# Patient Record
Sex: MALE | Race: WHITE | Employment: FULL TIME | URBAN - METROPOLITAN AREA
[De-identification: names, ages, dates, MRNs, and addresses within clinical notes are randomized per-mention and may not be internally consistent; named-entity substitution may affect disease eponyms.]

---

## 2017-09-06 ENCOUNTER — OCC HEALTH (OUTPATIENT)
Dept: OCCUPATIONAL MEDICINE | Age: 37
End: 2017-09-06
Attending: PHYSICIAN ASSISTANT

## 2018-06-28 ENCOUNTER — TELEPHONE (OUTPATIENT)
Dept: INTERNAL MEDICINE CLINIC | Facility: CLINIC | Age: 38
End: 2018-06-28

## 2018-08-13 ENCOUNTER — OFFICE VISIT (OUTPATIENT)
Dept: INTERNAL MEDICINE CLINIC | Facility: CLINIC | Age: 38
End: 2018-08-13
Payer: COMMERCIAL

## 2018-08-13 VITALS
SYSTOLIC BLOOD PRESSURE: 122 MMHG | BODY MASS INDEX: 32 KG/M2 | TEMPERATURE: 98 F | DIASTOLIC BLOOD PRESSURE: 80 MMHG | RESPIRATION RATE: 12 BRPM | OXYGEN SATURATION: 99 % | WEIGHT: 235.5 LBS | HEART RATE: 61 BPM

## 2018-08-13 DIAGNOSIS — M25.561 ACUTE PAIN OF RIGHT KNEE: Primary | ICD-10-CM

## 2018-08-13 PROCEDURE — 99213 OFFICE O/P EST LOW 20 MIN: CPT | Performed by: FAMILY MEDICINE

## 2018-08-13 RX ORDER — TRAMADOL HYDROCHLORIDE 50 MG/1
50 TABLET ORAL EVERY 6 HOURS PRN
Qty: 30 TABLET | Refills: 0 | Status: SHIPPED | OUTPATIENT
Start: 2018-08-13 | End: 2018-10-03

## 2018-08-13 NOTE — PROGRESS NOTES
Patient presents with:  Knee Pain: Right knee pain dt softball injury yesterday afternoon. Using Ibuprofen, elevating, and icing area. HPI:   Osman Rausch is a 34year old male present with complain of right knee pain.   Onset: Started yesterday a RRR without murmur  EXTREMITIES:   Right knee  exam:   - defect/deformity : none  - swelling/effusion: +  - bruising/ecchymosis: none  - Tenderness: +  -  Range of motion: from with pain  - joint laxity: none  - crepitus: none  - peripheral pulses: intact

## 2018-08-17 ENCOUNTER — HOSPITAL ENCOUNTER (OUTPATIENT)
Dept: MRI IMAGING | Age: 38
Discharge: HOME | End: 2018-08-17
Attending: FAMILY MEDICINE
Payer: COMMERCIAL

## 2018-08-17 ENCOUNTER — TELEPHONE (OUTPATIENT)
Dept: INTERNAL MEDICINE CLINIC | Facility: CLINIC | Age: 38
End: 2018-08-17

## 2018-08-17 DIAGNOSIS — M25.561 ACUTE PAIN OF RIGHT KNEE: ICD-10-CM

## 2018-08-17 PROCEDURE — 73721 MRI JNT OF LWR EXTRE W/O DYE: CPT | Performed by: FAMILY MEDICINE

## 2018-08-17 NOTE — TELEPHONE ENCOUNTER
Patient was in for [address] on 8/13/18 for R knee pain. Had MRI done this morning and would like to get results if possible by the end of the day. Notified patient that Liss Burns is not in the office and will therefore not be able to review the results.   Marlena

## 2018-08-17 NOTE — TELEPHONE ENCOUNTER
Patient would like to get Dr Luke Pérez to let him know today about these results; informed it has been charted to him and he is finishing clinic from this morning and I will pass message on to him/nurse again

## 2018-10-03 ENCOUNTER — LAB ENCOUNTER (OUTPATIENT)
Dept: LAB | Age: 38
End: 2018-10-03
Attending: FAMILY MEDICINE
Payer: COMMERCIAL

## 2018-10-03 ENCOUNTER — HOSPITAL ENCOUNTER (OUTPATIENT)
Dept: GENERAL RADIOLOGY | Age: 38
Discharge: HOME | End: 2018-10-03
Attending: FAMILY MEDICINE
Payer: COMMERCIAL

## 2018-10-03 ENCOUNTER — OFFICE VISIT (OUTPATIENT)
Dept: INTERNAL MEDICINE CLINIC | Facility: CLINIC | Age: 38
End: 2018-10-03
Payer: COMMERCIAL

## 2018-10-03 VITALS
BODY MASS INDEX: 33.05 KG/M2 | DIASTOLIC BLOOD PRESSURE: 70 MMHG | TEMPERATURE: 98 F | OXYGEN SATURATION: 99 % | WEIGHT: 244 LBS | HEART RATE: 74 BPM | SYSTOLIC BLOOD PRESSURE: 114 MMHG | HEIGHT: 72 IN

## 2018-10-03 DIAGNOSIS — Z01.818 PREOP EXAMINATION: Primary | ICD-10-CM

## 2018-10-03 DIAGNOSIS — S83.511D RUPTURE OF ANTERIOR CRUCIATE LIGAMENT OF RIGHT KNEE, SUBSEQUENT ENCOUNTER: ICD-10-CM

## 2018-10-03 DIAGNOSIS — Z01.818 PREOP EXAMINATION: ICD-10-CM

## 2018-10-03 PROCEDURE — 80053 COMPREHEN METABOLIC PANEL: CPT | Performed by: FAMILY MEDICINE

## 2018-10-03 PROCEDURE — 99243 OFF/OP CNSLTJ NEW/EST LOW 30: CPT | Performed by: FAMILY MEDICINE

## 2018-10-03 PROCEDURE — 93000 ELECTROCARDIOGRAM COMPLETE: CPT | Performed by: FAMILY MEDICINE

## 2018-10-03 PROCEDURE — 71046 X-RAY EXAM CHEST 2 VIEWS: CPT | Performed by: FAMILY MEDICINE

## 2018-10-03 PROCEDURE — 85025 COMPLETE CBC W/AUTO DIFF WBC: CPT | Performed by: FAMILY MEDICINE

## 2018-10-03 PROCEDURE — 87086 URINE CULTURE/COLONY COUNT: CPT | Performed by: FAMILY MEDICINE

## 2018-10-03 PROCEDURE — 36415 COLL VENOUS BLD VENIPUNCTURE: CPT | Performed by: FAMILY MEDICINE

## 2018-10-03 PROCEDURE — 81003 URINALYSIS AUTO W/O SCOPE: CPT | Performed by: FAMILY MEDICINE

## 2018-10-03 NOTE — PROGRESS NOTES
HPI:    Patient ID: Leonarda Closs is a 34year old male. HPI  Leonarda Closs is a 34year old male.   HPI:   Here for preop exam for his upcoming R ACL repair under the care of Dr Barrett Matters    Date is set for 10/22/18 at the [facility]     General anesthesia reviewed and he is cleared for his surgery     Review of Systems         Current Outpatient Medications:  Multiple Vitamin (DAILY MULTIVITAMIN OR) Take 1 Tab by mouth daily.  Disp:  Rfl:      Allergies:  Penicillins                 Comment:Told by mother

## 2018-10-11 ENCOUNTER — TELEPHONE (OUTPATIENT)
Dept: INTERNAL MEDICINE CLINIC | Facility: CLINIC | Age: 38
End: 2018-10-11

## 2018-10-11 NOTE — TELEPHONE ENCOUNTER
Faxed pre-op OV note, EKG, and labs to Dr. Ilsa Hernandez at [phone]. Fax confirmation received.

## 2018-10-11 NOTE — TELEPHONE ENCOUNTER
Pre-op labs reviewed with pt per provider result note. Please addend progress note to show if pt is ok to proceed with surgery. Will fax information to Dr. Kaykay Clark once completed.

## 2018-11-12 ENCOUNTER — OFFICE VISIT (OUTPATIENT)
Dept: INTERNAL MEDICINE CLINIC | Facility: CLINIC | Age: 38
End: 2018-11-12
Payer: COMMERCIAL

## 2018-11-12 VITALS
HEIGHT: 72 IN | WEIGHT: 245 LBS | BODY MASS INDEX: 33.18 KG/M2 | SYSTOLIC BLOOD PRESSURE: 126 MMHG | DIASTOLIC BLOOD PRESSURE: 86 MMHG | RESPIRATION RATE: 16 BRPM | TEMPERATURE: 98 F | HEART RATE: 78 BPM

## 2018-11-12 DIAGNOSIS — Z00.00 WELLNESS EXAMINATION: Primary | ICD-10-CM

## 2018-11-12 DIAGNOSIS — N52.9 ERECTILE DYSFUNCTION, UNSPECIFIED ERECTILE DYSFUNCTION TYPE: ICD-10-CM

## 2018-11-12 DIAGNOSIS — Z00.00 LABORATORY EXAM ORDERED AS PART OF ROUTINE GENERAL MEDICAL EXAMINATION: ICD-10-CM

## 2018-11-12 PROCEDURE — 90471 IMMUNIZATION ADMIN: CPT | Performed by: FAMILY MEDICINE

## 2018-11-12 PROCEDURE — 99396 PREV VISIT EST AGE 40-64: CPT | Performed by: FAMILY MEDICINE

## 2018-11-12 PROCEDURE — 90715 TDAP VACCINE 7 YRS/> IM: CPT | Performed by: FAMILY MEDICINE

## 2018-11-12 RX ORDER — SILDENAFIL 50 MG/1
50 TABLET, FILM COATED ORAL
Qty: 8 TABLET | Refills: 0 | Status: SHIPPED | OUTPATIENT
Start: 2018-11-12 | End: 2020-11-23

## 2018-11-12 RX ORDER — ACETAMINOPHEN AND CODEINE PHOSPHATE 300; 30 MG/1; MG/1
TABLET ORAL
Refills: 0 | COMMUNITY
Start: 2018-11-09 | End: 2019-11-21 | Stop reason: ALTCHOICE

## 2018-11-12 NOTE — PROGRESS NOTES
HPI:    Patient ID: Sudeep Bowers is a 34year old male.     HPI  Sudeep Bowers is a 34year old male who presents for a complete physical exam.   HPI:       Wt Readings from Last 6 Encounters:  11/12/18 : 245 lb  10/03/18 : 244 lb  08/13/18 : 235 l C/o ED in the last few months   Desire is there   Does not always get erection  NEURO: denies headaches  MUSC:  3 weeks ago R knee sx  Getting better   Has PT   Not really painful    PSYCHE: denies depression or anxiety  HEMATOLOGIC: denies hx of anemia  E Prescriptions      No prescriptions requested or ordered in this encounter       Imaging & Referrals:  None       #[id number]

## 2018-12-01 ENCOUNTER — TELEPHONE (OUTPATIENT)
Dept: INTERNAL MEDICINE CLINIC | Facility: CLINIC | Age: 38
End: 2018-12-01

## 2019-11-21 ENCOUNTER — OFFICE VISIT (OUTPATIENT)
Dept: INTERNAL MEDICINE CLINIC | Facility: CLINIC | Age: 39
End: 2019-11-21
Payer: COMMERCIAL

## 2019-11-21 ENCOUNTER — LAB ENCOUNTER (OUTPATIENT)
Dept: LAB | Age: 39
End: 2019-11-21
Attending: FAMILY MEDICINE
Payer: COMMERCIAL

## 2019-11-21 VITALS
DIASTOLIC BLOOD PRESSURE: 82 MMHG | HEART RATE: 74 BPM | BODY MASS INDEX: 31.35 KG/M2 | OXYGEN SATURATION: 98 % | HEIGHT: 72.5 IN | WEIGHT: 234 LBS | SYSTOLIC BLOOD PRESSURE: 118 MMHG

## 2019-11-21 DIAGNOSIS — Z00.00 WELLNESS EXAMINATION: ICD-10-CM

## 2019-11-21 DIAGNOSIS — Z00.00 LABORATORY EXAM ORDERED AS PART OF ROUTINE GENERAL MEDICAL EXAMINATION: ICD-10-CM

## 2019-11-21 DIAGNOSIS — Z00.00 WELLNESS EXAMINATION: Primary | ICD-10-CM

## 2019-11-21 PROCEDURE — 85025 COMPLETE CBC W/AUTO DIFF WBC: CPT | Performed by: FAMILY MEDICINE

## 2019-11-21 PROCEDURE — 36415 COLL VENOUS BLD VENIPUNCTURE: CPT | Performed by: FAMILY MEDICINE

## 2019-11-21 PROCEDURE — 80061 LIPID PANEL: CPT | Performed by: FAMILY MEDICINE

## 2019-11-21 PROCEDURE — 99396 PREV VISIT EST AGE 40-64: CPT | Performed by: FAMILY MEDICINE

## 2019-11-21 PROCEDURE — 80053 COMPREHEN METABOLIC PANEL: CPT | Performed by: FAMILY MEDICINE

## 2019-11-21 NOTE — PROGRESS NOTES
HPI:    Patient ID: Young Pat is a 37year old male.     HPI  Young Pat is a 37year old male who presents for a complete physical exam.   HPI:       Wt Readings from Last 6 Encounters:  11/21/19 : 234 lb (106.1 kg)  11/12/18 : 245 lb (111.1 pain on exertion  GI: denies abdominal pain  : denies dysuria  NEURO: denies headaches  PSYCHE: denies depression or anxiety  HEMATOLOGIC: denies hx of anemia  ENDOCRINE: denies thyroid history  ALL/ASTHMA: denies hx of allergy or asthma    EXAM:   BP 11 No prescriptions requested or ordered in this encounter       Imaging & Referrals:  None       #[id number]

## 2019-12-27 DIAGNOSIS — R79.89 ELEVATED SERUM CREATININE: ICD-10-CM

## 2019-12-27 DIAGNOSIS — E78.00 ELEVATED CHOLESTEROL: Primary | ICD-10-CM

## 2020-02-14 ENCOUNTER — TELEPHONE (OUTPATIENT)
Dept: INTERNAL MEDICINE CLINIC | Facility: CLINIC | Age: 40
End: 2020-02-14

## 2020-02-14 RX ORDER — SCOLOPAMINE TRANSDERMAL SYSTEM 1 MG/1
1 PATCH, EXTENDED RELEASE TRANSDERMAL
Qty: 2 PATCH | Refills: 0 | Status: SHIPPED | OUTPATIENT
Start: 2020-02-14 | End: 2020-11-23

## 2020-02-14 NOTE — TELEPHONE ENCOUNTER
Spoke with wife and she just wants something stronger than dramamine what ever Dr feels would work best

## 2020-02-14 NOTE — TELEPHONE ENCOUNTER
Patient Spouse calling in, seeking a motion sickness medication for upcoming trip. Will be going on a six day maryjane next Friday.     Pharmacy:  [facility] [address], [address] [place] [address], [phone]

## 2020-10-21 ENCOUNTER — TELEPHONE (OUTPATIENT)
Dept: INTERNAL MEDICINE CLINIC | Facility: CLINIC | Age: 40
End: 2020-10-21

## 2020-10-21 DIAGNOSIS — Z00.00 ROUTINE GENERAL MEDICAL EXAMINATION AT A HEALTH CARE FACILITY: Primary | ICD-10-CM

## 2020-10-21 NOTE — TELEPHONE ENCOUNTER
Patient scheduled appointment for annual physical. Requesting labs to be placed prior to appointment    Future Appointments   Date Time Provider Rony Gordonisti   11/23/2020  2:00 PM Alma Hammond MD [facility] [place]

## 2020-11-23 ENCOUNTER — OFFICE VISIT (OUTPATIENT)
Dept: INTERNAL MEDICINE CLINIC | Facility: CLINIC | Age: 40
End: 2020-11-23
Payer: COMMERCIAL

## 2020-11-23 ENCOUNTER — LAB ENCOUNTER (OUTPATIENT)
Dept: LAB | Age: 40
End: 2020-11-23
Attending: FAMILY MEDICINE
Payer: COMMERCIAL

## 2020-11-23 VITALS
RESPIRATION RATE: 16 BRPM | SYSTOLIC BLOOD PRESSURE: 134 MMHG | TEMPERATURE: 99 F | OXYGEN SATURATION: 99 % | BODY MASS INDEX: 31.73 KG/M2 | WEIGHT: 234.25 LBS | HEIGHT: 72 IN | DIASTOLIC BLOOD PRESSURE: 78 MMHG | HEART RATE: 72 BPM

## 2020-11-23 DIAGNOSIS — Z00.00 ROUTINE GENERAL MEDICAL EXAMINATION AT A HEALTH CARE FACILITY: Primary | ICD-10-CM

## 2020-11-23 DIAGNOSIS — R79.89 ELEVATED SERUM CREATININE: ICD-10-CM

## 2020-11-23 DIAGNOSIS — E78.00 ELEVATED CHOLESTEROL: ICD-10-CM

## 2020-11-23 DIAGNOSIS — Z00.00 LABORATORY EXAM ORDERED AS PART OF ROUTINE GENERAL MEDICAL EXAMINATION: ICD-10-CM

## 2020-11-23 DIAGNOSIS — Z00.00 ROUTINE GENERAL MEDICAL EXAMINATION AT A HEALTH CARE FACILITY: ICD-10-CM

## 2020-11-23 PROCEDURE — 3008F BODY MASS INDEX DOCD: CPT | Performed by: FAMILY MEDICINE

## 2020-11-23 PROCEDURE — 80053 COMPREHEN METABOLIC PANEL: CPT

## 2020-11-23 PROCEDURE — 81003 URINALYSIS AUTO W/O SCOPE: CPT

## 2020-11-23 PROCEDURE — 99072 ADDL SUPL MATRL&STAF TM PHE: CPT | Performed by: FAMILY MEDICINE

## 2020-11-23 PROCEDURE — 80061 LIPID PANEL: CPT

## 2020-11-23 PROCEDURE — 3075F SYST BP GE 130 - 139MM HG: CPT | Performed by: FAMILY MEDICINE

## 2020-11-23 PROCEDURE — 85025 COMPLETE CBC W/AUTO DIFF WBC: CPT

## 2020-11-23 PROCEDURE — 99396 PREV VISIT EST AGE 40-64: CPT | Performed by: FAMILY MEDICINE

## 2020-11-23 PROCEDURE — 3078F DIAST BP <80 MM HG: CPT | Performed by: FAMILY MEDICINE

## 2020-11-23 PROCEDURE — 36415 COLL VENOUS BLD VENIPUNCTURE: CPT

## 2020-11-23 NOTE — PROGRESS NOTES
Kristen Mann is a 41year old male who presents for a complete physical exam.   HPI:       Wt Readings from Last 6 Encounters:  11/23/20 : 234 lb 4 oz (106.3 kg)  11/21/19 : 234 lb (106.1 kg)  11/12/18 : 245 lb (111.1 kg)  10/03/18 : 244 lb (110.7 kg) exertion  GI: denies abdominal pain  : denies dysuria  NEURO: denies headaches  PSYCHE: denies depression or anxiety  HEMATOLOGIC: denies hx of anemia  ENDOCRINE: denies thyroid history  ALL/ASTHMA: denies hx of allergy or asthma    EXAM:   /78 (BP

## 2021-09-13 ENCOUNTER — IMMUNIZATION (OUTPATIENT)
Dept: LAB | Facility: HOSPITAL | Age: 41
End: 2021-09-13
Attending: EMERGENCY MEDICINE
Payer: COMMERCIAL

## 2021-09-13 DIAGNOSIS — Z23 NEED FOR VACCINATION: Primary | ICD-10-CM

## 2021-09-13 PROCEDURE — 0001A SARSCOV2 VAC 30MCG/0.3ML IM: CPT

## 2021-10-05 ENCOUNTER — IMMUNIZATION (OUTPATIENT)
Dept: LAB | Facility: HOSPITAL | Age: 41
End: 2021-10-05
Attending: EMERGENCY MEDICINE
Payer: COMMERCIAL

## 2021-10-05 DIAGNOSIS — Z23 NEED FOR VACCINATION: Primary | ICD-10-CM

## 2021-10-05 PROCEDURE — 0002A SARSCOV2 VAC 30MCG/0.3ML IM: CPT

## 2021-11-29 ENCOUNTER — OFFICE VISIT (OUTPATIENT)
Dept: INTERNAL MEDICINE CLINIC | Facility: CLINIC | Age: 41
End: 2021-11-29
Payer: COMMERCIAL

## 2021-11-29 VITALS
WEIGHT: 235 LBS | BODY MASS INDEX: 31.83 KG/M2 | HEART RATE: 76 BPM | TEMPERATURE: 98 F | DIASTOLIC BLOOD PRESSURE: 70 MMHG | HEIGHT: 72 IN | RESPIRATION RATE: 16 BRPM | SYSTOLIC BLOOD PRESSURE: 130 MMHG

## 2021-11-29 DIAGNOSIS — H61.20 IMPACTED CERUMEN, UNSPECIFIED LATERALITY: ICD-10-CM

## 2021-11-29 DIAGNOSIS — M25.511 CHRONIC RIGHT SHOULDER PAIN: ICD-10-CM

## 2021-11-29 DIAGNOSIS — Z00.00 LABORATORY EXAM ORDERED AS PART OF ROUTINE GENERAL MEDICAL EXAMINATION: ICD-10-CM

## 2021-11-29 DIAGNOSIS — Z00.00 ROUTINE GENERAL MEDICAL EXAMINATION AT A HEALTH CARE FACILITY: Primary | ICD-10-CM

## 2021-11-29 DIAGNOSIS — G89.29 CHRONIC RIGHT SHOULDER PAIN: ICD-10-CM

## 2021-11-29 PROCEDURE — 3008F BODY MASS INDEX DOCD: CPT | Performed by: FAMILY MEDICINE

## 2021-11-29 PROCEDURE — 3078F DIAST BP <80 MM HG: CPT | Performed by: FAMILY MEDICINE

## 2021-11-29 PROCEDURE — 3075F SYST BP GE 130 - 139MM HG: CPT | Performed by: FAMILY MEDICINE

## 2021-11-29 PROCEDURE — 99396 PREV VISIT EST AGE 40-64: CPT | Performed by: FAMILY MEDICINE

## 2021-11-29 NOTE — PROGRESS NOTES
Sonny Solis is a 35year old male who presents for a complete physical exam.   HPI:       Wt Readings from Last 6 Encounters:  11/29/21 : 235 lb (106.6 kg)  11/23/20 : 234 lb 4 oz (106.3 kg)  11/21/19 : 234 lb (106.1 kg)  11/12/18 : 245 lb (111.1 kg) shortness of breath  CARDIOVASCULAR: denies chest pain on exertion  GI: denies abdominal pain  : denies dysuria  NEURO: denies headaches  MUSC:   Still dealing w R shoulder pain    Has torn labrum   Has seen Dr Hien Branch: denies depression or anxiet

## 2022-11-14 ENCOUNTER — OFFICE VISIT (OUTPATIENT)
Dept: INTERNAL MEDICINE CLINIC | Facility: CLINIC | Age: 42
End: 2022-11-14
Payer: COMMERCIAL

## 2022-11-14 VITALS
HEART RATE: 76 BPM | WEIGHT: 240 LBS | RESPIRATION RATE: 14 BRPM | HEIGHT: 72 IN | BODY MASS INDEX: 32.51 KG/M2 | SYSTOLIC BLOOD PRESSURE: 122 MMHG | TEMPERATURE: 98 F | DIASTOLIC BLOOD PRESSURE: 78 MMHG | OXYGEN SATURATION: 96 %

## 2022-11-14 DIAGNOSIS — M79.671 BILATERAL FOOT PAIN: Primary | ICD-10-CM

## 2022-11-14 DIAGNOSIS — M79.672 BILATERAL FOOT PAIN: Primary | ICD-10-CM

## 2022-11-14 DIAGNOSIS — M77.8 LEFT ELBOW TENDONITIS: ICD-10-CM

## 2022-11-14 PROCEDURE — 99213 OFFICE O/P EST LOW 20 MIN: CPT | Performed by: FAMILY MEDICINE

## 2022-11-14 PROCEDURE — 3008F BODY MASS INDEX DOCD: CPT | Performed by: FAMILY MEDICINE

## 2022-11-14 PROCEDURE — 3078F DIAST BP <80 MM HG: CPT | Performed by: FAMILY MEDICINE

## 2022-11-14 PROCEDURE — 3074F SYST BP LT 130 MM HG: CPT | Performed by: FAMILY MEDICINE

## 2022-11-14 RX ORDER — METHYLPREDNISOLONE 4 MG/1
TABLET ORAL
Qty: 1 EACH | Refills: 0 | Status: SHIPPED | OUTPATIENT
Start: 2022-11-14

## 2022-12-01 ENCOUNTER — LAB ENCOUNTER (OUTPATIENT)
Dept: LAB | Age: 42
End: 2022-12-01
Attending: FAMILY MEDICINE
Payer: COMMERCIAL

## 2022-12-01 ENCOUNTER — OFFICE VISIT (OUTPATIENT)
Dept: INTERNAL MEDICINE CLINIC | Facility: CLINIC | Age: 42
End: 2022-12-01
Payer: COMMERCIAL

## 2022-12-01 VITALS
SYSTOLIC BLOOD PRESSURE: 122 MMHG | DIASTOLIC BLOOD PRESSURE: 78 MMHG | BODY MASS INDEX: 32.56 KG/M2 | TEMPERATURE: 98 F | WEIGHT: 240.38 LBS | OXYGEN SATURATION: 98 % | HEIGHT: 72 IN | HEART RATE: 65 BPM

## 2022-12-01 DIAGNOSIS — Z00.00 ROUTINE GENERAL MEDICAL EXAMINATION AT A HEALTH CARE FACILITY: Primary | ICD-10-CM

## 2022-12-01 DIAGNOSIS — Z00.00 ROUTINE GENERAL MEDICAL EXAMINATION AT A HEALTH CARE FACILITY: ICD-10-CM

## 2022-12-01 DIAGNOSIS — Z00.00 LABORATORY EXAM ORDERED AS PART OF ROUTINE GENERAL MEDICAL EXAMINATION: ICD-10-CM

## 2022-12-01 LAB
ALBUMIN SERPL-MCNC: 4.4 G/DL (ref 3.4–5)
ALBUMIN/GLOB SERPL: 1.4 {RATIO} (ref 1–2)
ALP LIVER SERPL-CCNC: 63 U/L
ALT SERPL-CCNC: 62 U/L
ANION GAP SERPL CALC-SCNC: 2 MMOL/L (ref 0–18)
AST SERPL-CCNC: 31 U/L (ref 15–37)
BASOPHILS # BLD AUTO: 0.05 X10(3) UL (ref 0–0.2)
BASOPHILS NFR BLD AUTO: 0.7 %
BILIRUB SERPL-MCNC: 0.7 MG/DL (ref 0.1–2)
BILIRUB UR QL STRIP.AUTO: NEGATIVE
BUN BLD-MCNC: 13 MG/DL (ref 7–18)
CALCIUM BLD-MCNC: 9.5 MG/DL (ref 8.5–10.1)
CHLORIDE SERPL-SCNC: 106 MMOL/L (ref 98–112)
CHOLEST SERPL-MCNC: 252 MG/DL (ref ?–200)
CLARITY UR REFRACT.AUTO: CLEAR
CO2 SERPL-SCNC: 29 MMOL/L (ref 21–32)
COLOR UR AUTO: YELLOW
CREAT BLD-MCNC: 1.2 MG/DL
EOSINOPHIL # BLD AUTO: 0.19 X10(3) UL (ref 0–0.7)
EOSINOPHIL NFR BLD AUTO: 2.7 %
ERYTHROCYTE [DISTWIDTH] IN BLOOD BY AUTOMATED COUNT: 12.6 %
FASTING PATIENT LIPID ANSWER: YES
FASTING STATUS PATIENT QL REPORTED: YES
GFR SERPLBLD BASED ON 1.73 SQ M-ARVRAT: 76 ML/MIN/1.73M2 (ref 60–?)
GLOBULIN PLAS-MCNC: 3.1 G/DL (ref 2.8–4.4)
GLUCOSE BLD-MCNC: 102 MG/DL (ref 70–99)
GLUCOSE UR STRIP.AUTO-MCNC: NEGATIVE MG/DL
HCT VFR BLD AUTO: 47.9 %
HDLC SERPL-MCNC: 63 MG/DL (ref 40–59)
HGB BLD-MCNC: 15.7 G/DL
IMM GRANULOCYTES # BLD AUTO: 0.03 X10(3) UL (ref 0–1)
IMM GRANULOCYTES NFR BLD: 0.4 %
KETONES UR STRIP.AUTO-MCNC: NEGATIVE MG/DL
LDLC SERPL CALC-MCNC: 158 MG/DL (ref ?–100)
LEUKOCYTE ESTERASE UR QL STRIP.AUTO: NEGATIVE
LYMPHOCYTES # BLD AUTO: 1.73 X10(3) UL (ref 1–4)
LYMPHOCYTES NFR BLD AUTO: 24.4 %
MCH RBC QN AUTO: 30.7 PG (ref 26–34)
MCHC RBC AUTO-ENTMCNC: 32.8 G/DL (ref 31–37)
MCV RBC AUTO: 93.7 FL
MONOCYTES # BLD AUTO: 0.67 X10(3) UL (ref 0.1–1)
MONOCYTES NFR BLD AUTO: 9.5 %
NEUTROPHILS # BLD AUTO: 4.41 X10 (3) UL (ref 1.5–7.7)
NEUTROPHILS # BLD AUTO: 4.41 X10(3) UL (ref 1.5–7.7)
NEUTROPHILS NFR BLD AUTO: 62.3 %
NITRITE UR QL STRIP.AUTO: NEGATIVE
NONHDLC SERPL-MCNC: 189 MG/DL (ref ?–130)
OSMOLALITY SERPL CALC.SUM OF ELEC: 284 MOSM/KG (ref 275–295)
PH UR STRIP.AUTO: 5.5 [PH] (ref 5–8)
PLATELET # BLD AUTO: 284 10(3)UL (ref 150–450)
POTASSIUM SERPL-SCNC: 4.5 MMOL/L (ref 3.5–5.1)
PROT SERPL-MCNC: 7.5 G/DL (ref 6.4–8.2)
PROT UR STRIP.AUTO-MCNC: NEGATIVE MG/DL
RBC # BLD AUTO: 5.11 X10(6)UL
RBC UR QL AUTO: NEGATIVE
SODIUM SERPL-SCNC: 137 MMOL/L (ref 136–145)
SP GR UR STRIP.AUTO: >=1.03 (ref 1–1.03)
TRIGL SERPL-MCNC: 174 MG/DL (ref 30–149)
UROBILINOGEN UR STRIP.AUTO-MCNC: 0.2 MG/DL
VLDLC SERPL CALC-MCNC: 34 MG/DL (ref 0–30)
WBC # BLD AUTO: 7.1 X10(3) UL (ref 4–11)

## 2022-12-01 PROCEDURE — 99396 PREV VISIT EST AGE 40-64: CPT | Performed by: FAMILY MEDICINE

## 2022-12-01 PROCEDURE — 3078F DIAST BP <80 MM HG: CPT | Performed by: FAMILY MEDICINE

## 2022-12-01 PROCEDURE — 3074F SYST BP LT 130 MM HG: CPT | Performed by: FAMILY MEDICINE

## 2022-12-01 PROCEDURE — 81003 URINALYSIS AUTO W/O SCOPE: CPT | Performed by: FAMILY MEDICINE

## 2022-12-01 PROCEDURE — 80061 LIPID PANEL: CPT | Performed by: FAMILY MEDICINE

## 2022-12-01 PROCEDURE — 80053 COMPREHEN METABOLIC PANEL: CPT | Performed by: FAMILY MEDICINE

## 2022-12-01 PROCEDURE — 3008F BODY MASS INDEX DOCD: CPT | Performed by: FAMILY MEDICINE

## 2022-12-01 PROCEDURE — 85025 COMPLETE CBC W/AUTO DIFF WBC: CPT | Performed by: FAMILY MEDICINE

## 2022-12-06 ENCOUNTER — TELEPHONE (OUTPATIENT)
Dept: INTERNAL MEDICINE CLINIC | Facility: CLINIC | Age: 42
End: 2022-12-06

## 2022-12-06 DIAGNOSIS — R74.8 ELEVATED LIVER ENZYMES: ICD-10-CM

## 2022-12-06 DIAGNOSIS — E78.00 ELEVATED CHOLESTEROL: Primary | ICD-10-CM

## 2022-12-20 ENCOUNTER — OFFICE VISIT (OUTPATIENT)
Dept: PODIATRY CLINIC | Facility: CLINIC | Age: 42
End: 2022-12-20
Payer: COMMERCIAL

## 2022-12-20 DIAGNOSIS — B07.0 PLANTAR WART, RIGHT FOOT: ICD-10-CM

## 2022-12-20 DIAGNOSIS — M79.671 ARCH PAIN OF RIGHT FOOT: ICD-10-CM

## 2022-12-20 DIAGNOSIS — M79.672 ARCH PAIN OF LEFT FOOT: Primary | ICD-10-CM

## 2022-12-20 PROCEDURE — L3000 FT INSERT UCB BERKELEY SHELL: HCPCS | Performed by: STUDENT IN AN ORGANIZED HEALTH CARE EDUCATION/TRAINING PROGRAM

## 2022-12-20 PROCEDURE — 99203 OFFICE O/P NEW LOW 30 MIN: CPT | Performed by: STUDENT IN AN ORGANIZED HEALTH CARE EDUCATION/TRAINING PROGRAM

## 2022-12-21 NOTE — PATIENT INSTRUCTIONS
-Apply Compound W to wart site every 1 to 2 days. Follow-up in 4 to 6 weeks for reevaluation of wart fails to resolve.  -We will call in orthotics are ready for pickup.

## 2023-02-22 ENCOUNTER — TELEPHONE (OUTPATIENT)
Dept: PODIATRY CLINIC | Facility: CLINIC | Age: 43
End: 2023-02-22

## 2023-02-22 NOTE — TELEPHONE ENCOUNTER
LVM with patient that orthotics are at [facility] office ready to be picked up.

## 2023-03-06 ENCOUNTER — TELEPHONE (OUTPATIENT)
Dept: PODIATRY CLINIC | Facility: CLINIC | Age: 43
End: 2023-03-06

## 2023-03-06 NOTE — TELEPHONE ENCOUNTER
S/w pt and he states he rc'd a bill for over $800 for orthotics and was wondering why because he checked with his insurance prior to getting casted and his insurance informed him that orthotics were covered. I advised him that if insurance may consider orthotics a covered benefit, but that doesn't mean he won't get billed for the orthotics if he has a deductible/coinsurance that he has to pay towards. He will follow up with insurance to see what the balance is being applied to. He will CB if needed.

## 2023-03-06 NOTE — TELEPHONE ENCOUNTER
Per pt bcbs informed him that his orthotics were covered and states he received a bill and asking to confirm if he did everything correctly. Per pt asking to speak to Dr. Karen Tom before he picks up the orthotics.  Please advse

## 2023-12-04 ENCOUNTER — LAB ENCOUNTER (OUTPATIENT)
Dept: LAB | Age: 43
End: 2023-12-04
Attending: FAMILY MEDICINE
Payer: COMMERCIAL

## 2023-12-04 ENCOUNTER — OFFICE VISIT (OUTPATIENT)
Dept: INTERNAL MEDICINE CLINIC | Facility: CLINIC | Age: 43
End: 2023-12-04
Payer: COMMERCIAL

## 2023-12-04 VITALS
SYSTOLIC BLOOD PRESSURE: 130 MMHG | TEMPERATURE: 98 F | BODY MASS INDEX: 32.21 KG/M2 | OXYGEN SATURATION: 98 % | DIASTOLIC BLOOD PRESSURE: 80 MMHG | HEIGHT: 71.75 IN | WEIGHT: 235.19 LBS | HEART RATE: 86 BPM

## 2023-12-04 DIAGNOSIS — Z00.00 LABORATORY EXAM ORDERED AS PART OF ROUTINE GENERAL MEDICAL EXAMINATION: ICD-10-CM

## 2023-12-04 DIAGNOSIS — R74.8 ELEVATED LIVER ENZYMES: ICD-10-CM

## 2023-12-04 DIAGNOSIS — E78.00 ELEVATED CHOLESTEROL: ICD-10-CM

## 2023-12-04 DIAGNOSIS — Z12.11 COLON CANCER SCREENING: ICD-10-CM

## 2023-12-04 DIAGNOSIS — Z00.00 WELLNESS EXAMINATION: Primary | ICD-10-CM

## 2023-12-04 LAB
ALT SERPL-CCNC: 31 U/L
AST SERPL-CCNC: 18 U/L (ref 15–37)
BILIRUB UR QL STRIP.AUTO: NEGATIVE
CHOLEST SERPL-MCNC: 204 MG/DL (ref ?–200)
CLARITY UR REFRACT.AUTO: CLEAR
FASTING PATIENT LIPID ANSWER: YES
GLUCOSE UR STRIP.AUTO-MCNC: 100 MG/DL
HDLC SERPL-MCNC: 59 MG/DL (ref 40–59)
KETONES UR STRIP.AUTO-MCNC: NEGATIVE MG/DL
LDLC SERPL CALC-MCNC: 126 MG/DL (ref ?–100)
LEUKOCYTE ESTERASE UR QL STRIP.AUTO: NEGATIVE
NITRITE UR QL STRIP.AUTO: NEGATIVE
NONHDLC SERPL-MCNC: 145 MG/DL (ref ?–130)
PH UR STRIP.AUTO: 5 [PH] (ref 5–8)
PROT UR STRIP.AUTO-MCNC: NEGATIVE MG/DL
RBC UR QL AUTO: NEGATIVE
SP GR UR STRIP.AUTO: 1.02 (ref 1–1.03)
TRIGL SERPL-MCNC: 105 MG/DL (ref 30–149)
UROBILINOGEN UR STRIP.AUTO-MCNC: NORMAL MG/DL
VLDLC SERPL CALC-MCNC: 19 MG/DL (ref 0–30)

## 2023-12-04 PROCEDURE — 80048 BASIC METABOLIC PNL TOTAL CA: CPT | Performed by: FAMILY MEDICINE

## 2023-12-04 PROCEDURE — 80061 LIPID PANEL: CPT | Performed by: FAMILY MEDICINE

## 2023-12-04 PROCEDURE — 84450 TRANSFERASE (AST) (SGOT): CPT | Performed by: FAMILY MEDICINE

## 2023-12-04 PROCEDURE — 81003 URINALYSIS AUTO W/O SCOPE: CPT | Performed by: FAMILY MEDICINE

## 2023-12-04 PROCEDURE — 3079F DIAST BP 80-89 MM HG: CPT | Performed by: FAMILY MEDICINE

## 2023-12-04 PROCEDURE — 3008F BODY MASS INDEX DOCD: CPT | Performed by: FAMILY MEDICINE

## 2023-12-04 PROCEDURE — 99396 PREV VISIT EST AGE 40-64: CPT | Performed by: FAMILY MEDICINE

## 2023-12-04 PROCEDURE — 3075F SYST BP GE 130 - 139MM HG: CPT | Performed by: FAMILY MEDICINE

## 2023-12-04 PROCEDURE — 84460 ALANINE AMINO (ALT) (SGPT): CPT | Performed by: FAMILY MEDICINE

## 2023-12-05 DIAGNOSIS — Z00.00 LABORATORY EXAM ORDERED AS PART OF ROUTINE GENERAL MEDICAL EXAMINATION: Primary | ICD-10-CM

## 2023-12-05 LAB
ANION GAP SERPL CALC-SCNC: 6 MMOL/L (ref 0–18)
BUN BLD-MCNC: 14 MG/DL (ref 9–23)
CALCIUM BLD-MCNC: 9.3 MG/DL (ref 8.5–10.1)
CHLORIDE SERPL-SCNC: 103 MMOL/L (ref 98–112)
CO2 SERPL-SCNC: 28 MMOL/L (ref 21–32)
CREAT BLD-MCNC: 1.23 MG/DL
EGFRCR SERPLBLD CKD-EPI 2021: 74 ML/MIN/1.73M2 (ref 60–?)
GLUCOSE BLD-MCNC: 90 MG/DL (ref 70–99)
OSMOLALITY SERPL CALC.SUM OF ELEC: 284 MOSM/KG (ref 275–295)
POTASSIUM SERPL-SCNC: 4.2 MMOL/L (ref 3.5–5.1)
SODIUM SERPL-SCNC: 137 MMOL/L (ref 136–145)

## 2023-12-27 ENCOUNTER — PATIENT MESSAGE (OUTPATIENT)
Dept: INTERNAL MEDICINE CLINIC | Facility: CLINIC | Age: 43
End: 2023-12-27

## 2023-12-27 NOTE — TELEPHONE ENCOUNTER
From: Nicole Weber  To: Magdalene Treviño  Sent: 12/27/2023 9:08 AM CST  Subject: Please fill out this form for my insurance    Thanks Doc!     Jayden Espinoza

## 2023-12-27 NOTE — TELEPHONE ENCOUNTER
Form completed    [facility] sent to patient to see if he would like to  form or have it faxed--must provide fax number

## 2024-03-08 ENCOUNTER — OFFICE VISIT (OUTPATIENT)
Dept: PODIATRY CLINIC | Facility: CLINIC | Age: 44
End: 2024-03-08
Payer: COMMERCIAL

## 2024-03-08 VITALS — SYSTOLIC BLOOD PRESSURE: 126 MMHG | DIASTOLIC BLOOD PRESSURE: 66 MMHG

## 2024-03-08 DIAGNOSIS — M72.2 PLANTAR FASCIITIS, RIGHT: Primary | ICD-10-CM

## 2024-03-08 DIAGNOSIS — M79.671 ARCH PAIN OF RIGHT FOOT: ICD-10-CM

## 2024-03-08 DIAGNOSIS — B07.0 PLANTAR WART, RIGHT FOOT: ICD-10-CM

## 2024-03-08 DIAGNOSIS — M79.672 ARCH PAIN OF LEFT FOOT: ICD-10-CM

## 2024-03-08 PROCEDURE — 99214 OFFICE O/P EST MOD 30 MIN: CPT | Performed by: STUDENT IN AN ORGANIZED HEALTH CARE EDUCATION/TRAINING PROGRAM

## 2024-03-08 PROCEDURE — 3078F DIAST BP <80 MM HG: CPT | Performed by: STUDENT IN AN ORGANIZED HEALTH CARE EDUCATION/TRAINING PROGRAM

## 2024-03-08 PROCEDURE — 3074F SYST BP LT 130 MM HG: CPT | Performed by: STUDENT IN AN ORGANIZED HEALTH CARE EDUCATION/TRAINING PROGRAM

## 2024-03-08 RX ORDER — DEXAMETHASONE SODIUM PHOSPHATE 4 MG/ML
2 VIAL (ML) INJECTION ONCE
Status: COMPLETED | OUTPATIENT
Start: 2024-03-08 | End: 2024-03-08

## 2024-03-08 NOTE — PROGRESS NOTES
Per Dr. Rivas to draw up .5ml of dexamethasone sodium phosphate. .5ml Kenalog. 10 and 1ml marcaine 0.5% for a right Heel injection.

## 2024-03-08 NOTE — PROGRESS NOTES
[facility]  Progress Note    Cricket Hannah is a 43 year old male. No chief complaint on file.        HPI:     Patient is a pleasant 42-year-old male who presents to clinic for evaluation of multiple pedal complaints.  He has struggled with some plantar fasciitis symptoms to his right foot.  He has pain to his heel is worse in the morning when resuming activity to rest.  His custom inserts are working well for him and he is hoping for a new pair this year.  He has completed authorization with his insurance.  He also struggled with plantar wart to his right plantar lateral foot and would like this evaluated.  It is not improved with Compound W.  No other complaints are mentioned.           Allergies: Penicillins   Current Outpatient Medications   Medication Sig Dispense Refill    Multiple Vitamin (DAILY MULTIVITAMIN OR) Take 1 Tab by mouth daily.        History reviewed. No pertinent past medical history.   Past Surgical History:   Procedure Laterality Date    KNEE SURGERY  2018    R knee ACL   Dr Husain      Family History   Problem Relation Age of Onset    Other (Other) Maternal Grandmother         alzheimers    Other (Other) Paternal Grandfather         alzhiermers      Social History     Socioeconomic History    Marital status:    Tobacco Use    Smoking status: Former    Smokeless tobacco: Never   Vaping Use    Vaping Use: Never used   Substance and Sexual Activity    Alcohol use: Yes     Alcohol/week: 6.0 - 8.0 standard drinks of alcohol     Types: 6 - 8 Standard drinks or equivalent per week     Comment: occ.     Drug use: No   Other Topics Concern    Caffeine Concern Yes     Comment: Soda sometimes    Exercise Yes     Comment: 2x/week           REVIEW OF SYSTEMS:     Today reviewed systems as documented below  GENERAL HEALTH: feels well otherwise  SKIN: denies any unusual skin lesions or rashes  RESPIRATORY: denies shortness of breath with exertion  CARDIOVASCULAR: denies chest pain on  exertion  GI: denies abdominal pain and denies heartburn  NEURO: denies headaches  MUSCULO: denies arthritis, back pain      EXAM:   /66 (BP Location: Right arm, Patient Position: Sitting, Cuff Size: adult)   GENERAL: well developed, well nourished, in no apparent distress  EXTREMITIES:   1. Integument: Normal skin temperature and turgor, hyperkeratotic lesion with petechiae noted to right plantar lateral heel consistent with plantar wart.  2. Vascular: Dorsalis pedis two out of four bilateral and posterior tibial pulses two out of   four bilateral, capillary refill normal.   3. Musculoskeletal: All muscle groups are graded 5 out of 5 in the foot and ankle.  Generalized pain noted arches.  Rectus foot type appreciated.  No pain with single heel and double heel rise.  Range of motion is within normal limits to bilateral feet.  Pain on palpation noted to right plantar heel at medial tubercle of calcaneus.   4. Neurological: Normal sharp dull sensation; reflexes normal.        ASSESSMENT AND PLAN:   Diagnoses and all orders for this visit:    Plantar fasciitis, right  -     dexamethasone (Decadron) 4 MG/ML injection 2 mg  -     triamcinolone acetonide (Kenalog-10) 10 mg/mL injection    Arch pain of left foot    Arch pain of right foot    Plantar wart, right foot        Plan:     -Patient examined, chart history reviewed.  -Discussed etiology of patient's condition and various treatment options.  -Patient does have generalized pain to bilateral arches likely from being on his feet a lot for work and lack of support.  He has plantar fasciitis symptoms to his right foot.  He enjoys his current custom inserts but they are over a-year-old and he would like a new pair.  He is completed prior authorization for insurance.  Will send order for new pair using same mold.  -Discussed importance of stretching.  Patient to stretch 3-5 times daily for plantar fasciitis.  He should continue with supportive shoes and avoid  walking barefoot.  -Discussed cortisone injection including benefits and risks.  Patient agrees and written consent was obtained.  After prepping site with alcohol,  administered Cortisone injection to right plantar heel consisting of 1 cc 0.5% Marcaine plain, 0.5 cc of Kenalog 10, and 0.5 cc of dexamethasone.  Patient tolerated injection well and there were no complications.  -Patient can ice or take ibuprofen if pain arises after injection.    -Patient also has condition consistent with plantar wart to right foot.  -Discussed topical cantharidin therapy including benefits, risks, and recovery period.  Patient agreeable to cantharidin application at this time.  -Hyperkeratotic lesion was pared with a dermal curette, without incident.  -Cantharidin was applied per standard technique and covered with moleskin.    -Patient to leave intact for 6 to 8 hours and then remove moleskin and wash site with soapy water.  -Patient to monitor for routine healing and blister formation.  Patient can dress site with bacitracin and Band-Aid as needed.  Patient to seek immediate medical attention if any acute signs of infection or other concerns arise.  -We will plan to reevaluate in clinic in 1 to 2 weeks.       All questions were answered to satisfaction.    The patient indicates understanding of these issues and agrees to the plan.        Arsen Rivas DPM

## 2024-03-22 ENCOUNTER — OFFICE VISIT (OUTPATIENT)
Dept: PODIATRY CLINIC | Facility: CLINIC | Age: 44
End: 2024-03-22
Payer: COMMERCIAL

## 2024-03-22 DIAGNOSIS — M72.2 PLANTAR FASCIITIS, RIGHT: Primary | ICD-10-CM

## 2024-03-22 DIAGNOSIS — B07.0 PLANTAR WART, RIGHT FOOT: ICD-10-CM

## 2024-03-22 PROCEDURE — 99213 OFFICE O/P EST LOW 20 MIN: CPT | Performed by: STUDENT IN AN ORGANIZED HEALTH CARE EDUCATION/TRAINING PROGRAM

## 2024-03-22 NOTE — PROGRESS NOTES
[facility]iat[facility]  Progress Note    Cricket Hannah is a 43 year old male.   Chief Complaint   Patient presents with    Follow - Up     Right foot- patient denies pain         HPI:     Patient is a pleasant 42-year-old male who presents to clinic for evaluation of multiple pedal complaints.  He has struggled with some plantar fasciitis symptoms to his right foot.  He relates that his plantar fasciitis symptoms improved with injection.  He has been ambulate with supportive shoes and inserts.  He tolerated cantharidin application well at last visit.  No other complaints are mentioned.           Allergies: Penicillins   Current Outpatient Medications   Medication Sig Dispense Refill    Multiple Vitamin (DAILY MULTIVITAMIN OR) Take 1 Tab by mouth daily.        History reviewed. No pertinent past medical history.   Past Surgical History:   Procedure Laterality Date    KNEE SURGERY  2018    R knee ACL   Dr Husain      Family History   Problem Relation Age of Onset    Other (Other) Maternal Grandmother         alzheimers    Other (Other) Paternal Grandfather         alzhiermers      Social History     Socioeconomic History    Marital status:    Tobacco Use    Smoking status: Former    Smokeless tobacco: Never   Vaping Use    Vaping Use: Never used   Substance and Sexual Activity    Alcohol use: Yes     Alcohol/week: 6.0 - 8.0 standard drinks of alcohol     Types: 6 - 8 Standard drinks or equivalent per week     Comment: occ.     Drug use: No   Other Topics Concern    Caffeine Concern Yes     Comment: Soda sometimes    Exercise Yes     Comment: 2x/week           REVIEW OF SYSTEMS:     Today reviewed systems as documented below  GENERAL HEALTH: feels well otherwise  SKIN: denies any unusual skin lesions or rashes  RESPIRATORY: denies shortness of breath with exertion  CARDIOVASCULAR: denies chest pain on exertion  GI: denies abdominal pain and denies heartburn  NEURO: denies headaches  MUSCULO: denies  arthritis, back pain      EXAM:   There were no vitals taken for this visit.  GENERAL: well developed, well nourished, in no apparent distress  EXTREMITIES:   1. Integument: Normal skin temperature and turgor, hyperkeratotic lesion with petechiae noted to right plantar lateral heel consistent with plantar wart.  Prior wart to left third toe appears resolved.  2. Vascular: Dorsalis pedis two out of four bilateral and posterior tibial pulses two out of   four bilateral, capillary refill normal.   3. Musculoskeletal: All muscle groups are graded 5 out of 5 in the foot and ankle.  Today there is no heel pain or arch pain.   4. Neurological: Normal sharp dull sensation; reflexes normal.        ASSESSMENT AND PLAN:   Diagnoses and all orders for this visit:    Plantar fasciitis, right    Plantar wart, right foot        Plan:     -Patient examined, chart history reviewed.  -Discussed etiology of patient's condition and various treatment options.  -Patient does have generalized pain to bilateral arches likely from being on his feet a lot for work and lack of support.  He has plantar fasciitis symptoms to his right foot.  His plantar fasciitis symptoms have improved from custom inserts and cortisone injection.  She is continue to ambulate with supportive shoes and stretch regularly.    -Patient also has condition consistent with plantar wart to right foot.  -Discussed repeat topical cantharidin therapy including benefits, risks, and recovery period.  Patient agreeable to cantharidin application at this time.  -Hyperkeratotic lesion was pared with a dermal curette, without incident.  -Cantharidin was applied per standard technique and covered with moleskin.    -Patient to leave intact for 6 to 8 hours and then remove moleskin and wash site with soapy water.  -Patient to monitor for routine healing and blister formation.  Patient can dress site with bacitracin and Band-Aid as needed.  Patient to seek immediate medical  attention if any acute signs of infection or other concerns arise.  -We will plan to reevaluate in clinic in 1 to 2 weeks.       All questions were answered to satisfaction.    The patient indicates understanding of these issues and agrees to the plan.        Arsen Rivas DPM

## 2024-04-03 ENCOUNTER — OFFICE VISIT (OUTPATIENT)
Dept: PODIATRY CLINIC | Facility: CLINIC | Age: 44
End: 2024-04-03
Payer: COMMERCIAL

## 2024-04-03 ENCOUNTER — TELEPHONE (OUTPATIENT)
Dept: PODIATRY CLINIC | Facility: CLINIC | Age: 44
End: 2024-04-03

## 2024-04-03 DIAGNOSIS — B07.0 PLANTAR WART, RIGHT FOOT: Primary | ICD-10-CM

## 2024-04-03 PROCEDURE — 17111 DESTRUCTION B9 LESIONS 15/>: CPT | Performed by: STUDENT IN AN ORGANIZED HEALTH CARE EDUCATION/TRAINING PROGRAM

## 2024-04-03 NOTE — PROGRESS NOTES
[facility]  Progress Note    Cricket Hannah is a 43 year old male.   Chief Complaint   Patient presents with    Follow - Up     Bilateral feet- warts          HPI:     Patient is a pleasant 43-year-old male who presents to clinic fo for follow-up of plantar warts to his right foot.  He did have good response last cantharidin application presents today for reevaluation.  He is also here to  orthotics.  No other complaints are mentioned.         Allergies: Penicillins   Current Outpatient Medications   Medication Sig Dispense Refill    Multiple Vitamin (DAILY MULTIVITAMIN OR) Take 1 Tab by mouth daily.        History reviewed. No pertinent past medical history.   Past Surgical History:   Procedure Laterality Date    KNEE SURGERY  2018    R knee ACL   Dr Husain      Family History   Problem Relation Age of Onset    Other (Other) Maternal Grandmother         alzheimers    Other (Other) Paternal Grandfather         alzhiermers      Social History     Socioeconomic History    Marital status:    Tobacco Use    Smoking status: Former    Smokeless tobacco: Never   Vaping Use    Vaping Use: Never used   Substance and Sexual Activity    Alcohol use: Yes     Alcohol/week: 6.0 - 8.0 standard drinks of alcohol     Types: 6 - 8 Standard drinks or equivalent per week     Comment: occ.     Drug use: No   Other Topics Concern    Caffeine Concern Yes     Comment: Soda sometimes    Exercise Yes     Comment: 2x/week           REVIEW OF SYSTEMS:     Today reviewed systems as documented below  GENERAL HEALTH: feels well otherwise  SKIN: denies any unusual skin lesions or rashes  RESPIRATORY: denies shortness of breath with exertion  CARDIOVASCULAR: denies chest pain on exertion  GI: denies abdominal pain and denies heartburn  NEURO: denies headaches  MUSCULO: denies arthritis, back pain      EXAM:   There were no vitals taken for this visit.  GENERAL: well developed, well nourished, in no apparent  distress  EXTREMITIES:       1. Integument: Normal skin temperature and turgor, hyperkeratotic lesion with petechiae noted to right plantar lateral heel consistent with plantar wart.  Prior wart to left third toe and right forefoot appears resolved.  2. Vascular: Dorsalis pedis two out of four bilateral and posterior tibial pulses two out of   four bilateral, capillary refill normal.   3. Musculoskeletal: All muscle groups are graded 5 out of 5 in the foot and ankle.  Today there is no heel pain or arch pain.   4. Neurological: Normal sharp dull sensation; reflexes normal.        ASSESSMENT AND PLAN:   Diagnoses and all orders for this visit:    Plantar wart, right foot          Plan:     -Patient examined, chart history reviewed.  -Discussed etiology of patient's condition and various treatment options.  -Patient does have generalized pain to bilateral arches likely from being on his feet a lot for work and lack of support.  He has plantar fasciitis symptoms to his right foot.  His plantar fasciitis symptoms have improved from custom inserts and cortisone injection.  She is continue to ambulate with supportive shoes and stretch regularly.  Custom inserts also dispensed in clinic today.    -Patient also has condition consistent with plantar wart to right foot.  The sites have improved with cantharidin application.  -Discussed repeat topical cantharidin therapy including benefits, risks, and recovery period.  Patient agreeable to cantharidin application at this time.  -Hyperkeratotic lesion was pared with a dermal curette, without incident.  -Cantharidin was applied per standard technique and covered with moleskin.    -Patient to leave intact for 6 to 8 hours and then remove moleskin and wash site with soapy water.  -Patient to monitor for routine healing and blister formation.  Patient can dress site with bacitracin and Band-Aid as needed.  Patient to seek immediate medical attention if any acute signs of infection  or other concerns arise.  -We will plan to reevaluate in clinic in 1 to 2 weeks.       All questions were answered to satisfaction.    The patient indicates understanding of these issues and agrees to the plan.        Arsen Rivas DPM

## 2024-04-03 NOTE — TELEPHONE ENCOUNTER
Did not call pt to let him know orthotics came in on 4/1 but has appt on 4/3 and will give them to him at today's visit.

## 2024-04-17 ENCOUNTER — OFFICE VISIT (OUTPATIENT)
Dept: PODIATRY CLINIC | Facility: CLINIC | Age: 44
End: 2024-04-17
Payer: COMMERCIAL

## 2024-04-17 DIAGNOSIS — B07.0 PLANTAR WART, RIGHT FOOT: Primary | ICD-10-CM

## 2024-04-17 DIAGNOSIS — M72.2 PLANTAR FASCIITIS, RIGHT: ICD-10-CM

## 2024-04-17 DIAGNOSIS — M79.671 ARCH PAIN OF RIGHT FOOT: ICD-10-CM

## 2024-04-17 DIAGNOSIS — M79.672 ARCH PAIN OF LEFT FOOT: ICD-10-CM

## 2024-04-17 NOTE — PROGRESS NOTES
[facility] Podiatry  Progress Note    Cricket Hannah is a 44 year old male.   Chief Complaint   Patient presents with    Warts     Bilateral foot f/u - states he thinks they are good - no pain          HPI:     Patient is a pleasant 44-year-old male who presents to clinic fo for follow-up of plantar warts to his right foot.  He did have good response last cantharidin application presents today for reevaluation.  He tolerated last application well and thinks warts are doing well.  His orthotics are also working well for him.  No other complaints are mentioned.    Allergies: Penicillins   Current Outpatient Medications   Medication Sig Dispense Refill    Multiple Vitamin (DAILY MULTIVITAMIN OR) Take 1 Tab by mouth daily.        History reviewed. No pertinent past medical history.   Past Surgical History:   Procedure Laterality Date    Knee surgery  2018    R knee ACL   Dr Husain      Family History   Problem Relation Age of Onset    Other (Other) Maternal Grandmother         alzheimers    Other (Other) Paternal Grandfather         alzhiermers      Social History     Socioeconomic History    Marital status:    Tobacco Use    Smoking status: Former    Smokeless tobacco: Never   Vaping Use    Vaping status: Never Used   Substance and Sexual Activity    Alcohol use: Yes     Alcohol/week: 6.0 - 8.0 standard drinks of alcohol     Types: 6 - 8 Standard drinks or equivalent per week     Comment: occ.     Drug use: No   Other Topics Concern    Caffeine Concern Yes     Comment: Soda sometimes    Exercise Yes     Comment: 2x/week           REVIEW OF SYSTEMS:     Today reviewed systems as documented below  GENERAL HEALTH: feels well otherwise  SKIN: denies any unusual skin lesions or rashes  RESPIRATORY: denies shortness of breath with exertion  CARDIOVASCULAR: denies chest pain on exertion  GI: denies abdominal pain and denies heartburn  NEURO: denies headaches  MUSCULO: denies arthritis, back pain      EXAM:    There were no vitals taken for this visit.  GENERAL: well developed, well nourished, in no apparent distress  EXTREMITIES:             1. Integument: Normal skin temperature and turgor, hyperkeratotic lesion with petechiae noted to right plantar lateral heel consistent with plantar wart. Appear mostly resolved/greatly improved from prior visit.  Prior wart to left third toe and right forefoot appears mostly resolved.  2. Vascular: Dorsalis pedis two out of four bilateral and posterior tibial pulses two out of   four bilateral, capillary refill normal.   3. Musculoskeletal: All muscle groups are graded 5 out of 5 in the foot and ankle.  Today there is no heel pain or arch pain.   4. Neurological: Normal sharp dull sensation; reflexes normal.        ASSESSMENT AND PLAN:   Diagnoses and all orders for this visit:    Plantar wart, right foot    Plantar fasciitis, right    Arch pain of left foot    Arch pain of right foot            Plan:     -Patient examined, chart history reviewed.  -Patient also has condition consistent with plantar wart to right foot.  The sites have improved with cantharidin application.  -Patient's warts have improved very nicely.  He would like to continue with Compound W to try to treat remainder of wart so he does not requires frequent visits.  We can follow-up in approximately a month.  If symptoms worsen or fail to improve we could consider further cantharidin application or more aggressive treatment options as well.    All questions were answered to satisfaction.    The patient indicates understanding of these issues and agrees to the plan.        Arsen Rivas DPM

## 2024-05-17 ENCOUNTER — OFFICE VISIT (OUTPATIENT)
Dept: PODIATRY CLINIC | Facility: CLINIC | Age: 44
End: 2024-05-17

## 2024-05-17 DIAGNOSIS — B07.0 PLANTAR WART, RIGHT FOOT: Primary | ICD-10-CM

## 2024-05-17 NOTE — PROGRESS NOTES
[facility] Podiatry  Progress Note    Cricket Hannah is a 44 year old male.   Chief Complaint   Patient presents with    Follow - Up     Plantar wart bilateral feet.         HPI:     Patient is a pleasant 44-year-old male who presents to clinic fo for follow-up of plantar warts to his right foot.  He did have good response last cantharidin application presents today for reevaluation.  Lately he has been using over-the-counter topical medication to his warts.  He presents today for reevaluation.  No other complaints are mentioned.      Allergies: Penicillins   Current Outpatient Medications   Medication Sig Dispense Refill    Multiple Vitamin (DAILY MULTIVITAMIN OR) Take 1 Tab by mouth daily.        History reviewed. No pertinent past medical history.   Past Surgical History:   Procedure Laterality Date    Knee surgery  2018    R knee ACL   Dr Husain      Family History   Problem Relation Age of Onset    Other (Other) Maternal Grandmother         alzheimers    Other (Other) Paternal Grandfather         alzhiermers      Social History     Socioeconomic History    Marital status:    Tobacco Use    Smoking status: Former    Smokeless tobacco: Never   Vaping Use    Vaping status: Never Used   Substance and Sexual Activity    Alcohol use: Yes     Alcohol/week: 6.0 - 8.0 standard drinks of alcohol     Types: 6 - 8 Standard drinks or equivalent per week     Comment: occ.     Drug use: No   Other Topics Concern    Caffeine Concern Yes     Comment: Soda sometimes    Exercise Yes     Comment: 2x/week           REVIEW OF SYSTEMS:     Today reviewed systems as documented below  GENERAL HEALTH: feels well otherwise  SKIN: denies any unusual skin lesions or rashes  RESPIRATORY: denies shortness of breath with exertion  CARDIOVASCULAR: denies chest pain on exertion  GI: denies abdominal pain and denies heartburn  NEURO: denies headaches  MUSCULO: denies arthritis, back pain      EXAM:   There were no vitals taken for  this visit.  GENERAL: well developed, well nourished, in no apparent distress  EXTREMITIES:       1. Integument: Normal skin temperature and turgor, hyperkeratotic lesion with petechiae noted to right plantar lateral heel consistent with plantar wart. Appear improved but there is still some verruca remaining to his heel.  HPK trimmed down to healthy tissue/pinpoint bleeding without incident.  2. Vascular: Dorsalis pedis two out of four bilateral and posterior tibial pulses two out of   four bilateral, capillary refill normal.   3. Musculoskeletal: All muscle groups are graded 5 out of 5 in the foot and ankle.  Today there is no heel pain or arch pain.   4. Neurological: Normal sharp dull sensation; reflexes normal.        ASSESSMENT AND PLAN:   Diagnoses and all orders for this visit:    Plantar wart, right foot              Plan:     -Patient examined, chart history reviewed.  -Patient also has condition consistent with plantar wart to right foot.  The sites have improved with cantharidin application.  Lately patient has been using topical medication to treat warts.  HPK/built-up skin was trimmed to healthy tissue density.  -Patient's warts have improved very nicely.  He would like to continue with Compound W to try to treat remainder of wart so he does not requires frequent visits.  We can follow-up in approximately a month.  If symptoms worsen or fail to improve we could consider further cantharidin application or more aggressive treatment options as well.    All questions were answered to satisfaction.    The patient indicates understanding of these issues and agrees to the plan.        Arsen Rivas DPM

## 2024-11-15 PROBLEM — Z12.11 SPECIAL SCREENING FOR MALIGNANT NEOPLASM OF COLON: Status: ACTIVE | Noted: 2024-11-15

## 2024-11-15 PROBLEM — D12.2 BENIGN NEOPLASM OF ASCENDING COLON: Status: ACTIVE | Noted: 2024-11-15

## 2024-12-04 ENCOUNTER — TELEPHONE (OUTPATIENT)
Dept: INTERNAL MEDICINE CLINIC | Facility: CLINIC | Age: 44
End: 2024-12-04

## 2024-12-04 DIAGNOSIS — Z00.00 LABORATORY EXAM ORDERED AS PART OF ROUTINE GENERAL MEDICAL EXAMINATION: Primary | ICD-10-CM

## 2024-12-04 NOTE — TELEPHONE ENCOUNTER
Pt requesting annual labs, scheduled upcoming cpx.     Future Appointments   Date Time Provider Department Center   12/27/2024 12:00 PM Zhao Allan MD [facility] [place]

## 2024-12-06 ENCOUNTER — LAB ENCOUNTER (OUTPATIENT)
Dept: LAB | Age: 44
End: 2024-12-06
Attending: FAMILY MEDICINE
Payer: COMMERCIAL

## 2024-12-06 DIAGNOSIS — Z00.00 LABORATORY EXAM ORDERED AS PART OF ROUTINE GENERAL MEDICAL EXAMINATION: ICD-10-CM

## 2024-12-06 LAB
ALBUMIN SERPL-MCNC: 4.6 G/DL (ref 3.2–4.8)
ALBUMIN/GLOB SERPL: 1.6 {RATIO} (ref 1–2)
ALP LIVER SERPL-CCNC: 64 U/L
ALT SERPL-CCNC: 30 U/L
ANION GAP SERPL CALC-SCNC: 5 MMOL/L (ref 0–18)
AST SERPL-CCNC: 24 U/L (ref ?–34)
BASOPHILS # BLD AUTO: 0.06 X10(3) UL (ref 0–0.2)
BASOPHILS NFR BLD AUTO: 0.9 %
BILIRUB SERPL-MCNC: 0.9 MG/DL (ref 0.3–1.2)
BILIRUB UR QL STRIP.AUTO: NEGATIVE
BUN BLD-MCNC: 17 MG/DL (ref 9–23)
CALCIUM BLD-MCNC: 9.6 MG/DL (ref 8.7–10.4)
CHLORIDE SERPL-SCNC: 104 MMOL/L (ref 98–112)
CHOLEST SERPL-MCNC: 225 MG/DL (ref ?–200)
CLARITY UR REFRACT.AUTO: CLEAR
CO2 SERPL-SCNC: 29 MMOL/L (ref 21–32)
COLOR UR AUTO: COLORLESS
CREAT BLD-MCNC: 1.34 MG/DL
EGFRCR SERPLBLD CKD-EPI 2021: 66 ML/MIN/1.73M2 (ref 60–?)
EOSINOPHIL # BLD AUTO: 0.41 X10(3) UL (ref 0–0.7)
EOSINOPHIL NFR BLD AUTO: 6.3 %
ERYTHROCYTE [DISTWIDTH] IN BLOOD BY AUTOMATED COUNT: 12.6 %
FASTING PATIENT LIPID ANSWER: YES
FASTING STATUS PATIENT QL REPORTED: YES
GLOBULIN PLAS-MCNC: 2.8 G/DL (ref 2–3.5)
GLUCOSE BLD-MCNC: 88 MG/DL (ref 70–99)
GLUCOSE UR STRIP.AUTO-MCNC: NORMAL MG/DL
HCT VFR BLD AUTO: 47.7 %
HDLC SERPL-MCNC: 63 MG/DL (ref 40–59)
HGB BLD-MCNC: 15.8 G/DL
IMM GRANULOCYTES # BLD AUTO: 0.02 X10(3) UL (ref 0–1)
IMM GRANULOCYTES NFR BLD: 0.3 %
KETONES UR STRIP.AUTO-MCNC: NEGATIVE MG/DL
LDLC SERPL CALC-MCNC: 139 MG/DL (ref ?–100)
LEUKOCYTE ESTERASE UR QL STRIP.AUTO: NEGATIVE
LYMPHOCYTES # BLD AUTO: 1.86 X10(3) UL (ref 1–4)
LYMPHOCYTES NFR BLD AUTO: 28.4 %
MCH RBC QN AUTO: 30.5 PG (ref 26–34)
MCHC RBC AUTO-ENTMCNC: 33.1 G/DL (ref 31–37)
MCV RBC AUTO: 92.1 FL
MONOCYTES # BLD AUTO: 0.76 X10(3) UL (ref 0.1–1)
MONOCYTES NFR BLD AUTO: 11.6 %
NEUTROPHILS # BLD AUTO: 3.43 X10 (3) UL (ref 1.5–7.7)
NEUTROPHILS # BLD AUTO: 3.43 X10(3) UL (ref 1.5–7.7)
NEUTROPHILS NFR BLD AUTO: 52.5 %
NITRITE UR QL STRIP.AUTO: NEGATIVE
NONHDLC SERPL-MCNC: 162 MG/DL (ref ?–130)
OSMOLALITY SERPL CALC.SUM OF ELEC: 287 MOSM/KG (ref 275–295)
PH UR STRIP.AUTO: 6 [PH] (ref 5–8)
PLATELET # BLD AUTO: 300 10(3)UL (ref 150–450)
POTASSIUM SERPL-SCNC: 4.5 MMOL/L (ref 3.5–5.1)
PROT SERPL-MCNC: 7.4 G/DL (ref 5.7–8.2)
PROT UR STRIP.AUTO-MCNC: NEGATIVE MG/DL
RBC # BLD AUTO: 5.18 X10(6)UL
RBC UR QL AUTO: NEGATIVE
SODIUM SERPL-SCNC: 138 MMOL/L (ref 136–145)
SP GR UR STRIP.AUTO: 1.01 (ref 1–1.03)
TRIGL SERPL-MCNC: 130 MG/DL (ref 30–149)
UROBILINOGEN UR STRIP.AUTO-MCNC: NORMAL MG/DL
VLDLC SERPL CALC-MCNC: 24 MG/DL (ref 0–30)
WBC # BLD AUTO: 6.5 X10(3) UL (ref 4–11)

## 2024-12-06 PROCEDURE — 80061 LIPID PANEL: CPT

## 2024-12-06 PROCEDURE — 80053 COMPREHEN METABOLIC PANEL: CPT

## 2024-12-06 PROCEDURE — 85025 COMPLETE CBC W/AUTO DIFF WBC: CPT

## 2024-12-06 PROCEDURE — 81003 URINALYSIS AUTO W/O SCOPE: CPT

## 2024-12-06 PROCEDURE — 36415 COLL VENOUS BLD VENIPUNCTURE: CPT

## 2024-12-06 NOTE — TELEPHONE ENCOUNTER
SAEED sent to patient informing him he can get labs done and schedule [facility] central scheduling [facility]

## 2024-12-27 ENCOUNTER — OFFICE VISIT (OUTPATIENT)
Dept: INTERNAL MEDICINE CLINIC | Facility: CLINIC | Age: 44
End: 2024-12-27
Payer: COMMERCIAL

## 2024-12-27 VITALS
SYSTOLIC BLOOD PRESSURE: 124 MMHG | WEIGHT: 228 LBS | HEIGHT: 71.75 IN | HEART RATE: 73 BPM | OXYGEN SATURATION: 97 % | RESPIRATION RATE: 18 BRPM | DIASTOLIC BLOOD PRESSURE: 72 MMHG | TEMPERATURE: 98 F | BODY MASS INDEX: 31.22 KG/M2

## 2024-12-27 DIAGNOSIS — Z00.00 WELLNESS EXAMINATION: Primary | ICD-10-CM

## 2024-12-27 DIAGNOSIS — Z00.00 LABORATORY EXAM ORDERED AS PART OF ROUTINE GENERAL MEDICAL EXAMINATION: ICD-10-CM

## 2024-12-27 PROBLEM — Z12.11 SPECIAL SCREENING FOR MALIGNANT NEOPLASM OF COLON: Status: RESOLVED | Noted: 2024-11-15 | Resolved: 2024-12-27

## 2024-12-27 NOTE — PROGRESS NOTES
Cricket Hannah is a 44 year old male who presents for a complete physical exam.   HPI:       Wt Readings from Last 6 Encounters:   12/27/24 228 lb (103.4 kg)   11/15/24 240 lb (108.9 kg)   12/04/23 235 lb 3.2 oz (106.7 kg)   12/01/22 240 lb 6.4 oz (109 kg)   11/14/22 240 lb (108.9 kg)   11/29/21 235 lb (106.6 kg)     Body mass index is 31.14 kg/m².     Cholesterol, Total (mg/dL)   Date Value   12/06/2024 225 (H)   12/04/2023 204 (H)   12/01/2022 252 (H)     CHOLESTEROL (mg/dL)   Date Value   08/02/2013 236 (H)     HDL Cholesterol (mg/dL)   Date Value   12/06/2024 63 (H)   12/04/2023 59   12/01/2022 63 (H)     HDL CHOL (mg/dL)   Date Value   08/02/2013 59     LDL Cholesterol (mg/dL)   Date Value   12/06/2024 139 (H)   12/04/2023 126 (H)   12/01/2022 158 (H)     LDL CHOLESTROL (mg/dL)   Date Value   08/02/2013 146 (H)     AST (U/L)   Date Value   12/06/2024 24   12/04/2023 18   12/01/2022 31   08/02/2013 21     ALT (U/L)   Date Value   12/06/2024 30   12/04/2023 31   12/01/2022 62 (H)   08/02/2013 39      Current Outpatient Medications   Medication Sig Dispense Refill    Multiple Vitamin (DAILY MULTIVITAMIN OR) Take 1 Tab by mouth daily.        Past Medical History:    Heartburn      Past Surgical History:   Procedure Laterality Date    Knee surgery  2018    R knee ACL   Dr Husain    Other surgical history  3 years ago    ACL repair      Family History   Problem Relation Age of Onset    Other (Other) Maternal Grandmother         alzheimers    Other (Other) Paternal Grandfather         alzhiermers      Social History:  Social History     Socioeconomic History    Marital status:    Tobacco Use    Smoking status: Former     Current packs/day: 0.00     Types: Cigarettes    Smokeless tobacco: Never   Vaping Use    Vaping status: Never Used   Substance and Sexual Activity    Alcohol use: Yes     Alcohol/week: 6.0 - 8.0 standard drinks of alcohol     Types: 6 - 8 Standard drinks or equivalent per week     Comment:  occ.     Drug use: No   Other Topics Concern    Caffeine Concern No    Stress Concern No    Weight Concern No    Special Diet No    Exercise Yes    Seat Belt Yes      .        REVIEW OF SYSTEMS:   GENERAL: feels well otherwise  SKIN: denies rash  HEENT: denies nasal congestion, sinus pain or ST  LUNGS: denies shortness of breath  CARDIOVASCULAR: denies chest pain on exertion  GI: denies abdominal pain  : denies dysuria  NEURO: denies headaches  PSYCHE: denies depression or anxiety  HEMATOLOGIC: denies hx of anemia  ENDOCRINE: denies thyroid history  ALL/ASTHMA: denies hx of allergy or asthma    EXAM:   /72 (BP Location: Left arm, Patient Position: Sitting, Cuff Size: adult)   Pulse 73   Temp 97.6 °F (36.4 °C) (Temporal)   Resp 18   Ht 5' 11.75\" (1.822 m)   Wt 228 lb (103.4 kg)   SpO2 97%   BMI 31.14 kg/m²   Body mass index is 31.14 kg/m².   GENERAL: well developed, well nourished,in no apparent distress  SKIN: no rashes,  HEENT: atraumatic, normocephalic,ears and throat are clear  NECK: supple,no adenopathy  LUNGS: clear to auscultation  CARDIO: RRR without murmur  GI: good BS's,no masses, HSM or tenderness  : two descended testes,no masses,no hernia,no penile lesions  EXTREMITIES: no edema  NEURO: motor and sensory are grossly intact    ASSESSMENT AND PLAN:   Cricket Hannah is a 44 year old male who presents for a complete physical exam.  Health maintenance,  discussed labs    LDL can be better   discussed cscope  leisa 10    rec [facility]  not interested in vaccines   The patient indicates understanding of these issues and agrees to the plan.  The patient is asked to return for CPX in 1yr.

## (undated) NOTE — LETTER
AUTHORIZATION FOR SURGICAL OPERATION OR OTHER PROCEDURE    1. I hereby authorize Dr. Arsen Rivas, and [facility] staff assigned to my case to perform the following operation and/or procedure at the [facility] site:    _______________________________________________________________________________________________    Cortisone Injection Right Heel  _______________________________________________________________________________________________    2.  My physician has explained the nature and purpose of the operation or other procedure, possible alternative methods of treatment, the risks involved, and the possibility of complication to me.  I acknowledge that no guarantee has been made as to the result that may be obtained.  3.  I recognize that, during the course of this operation, or other procedure, unforseen conditions may necessitate additional or different procedure than those listed above.  I, therefore, further authorize and request that the above named physician, his/her physician assistants or designees perform such procedures as are, in his/her professional opinion, necessary and desirable.  4.  Any tissue or organs removed in the operation or other procedure may be disposed of by and at the discretion of the [facility] and [facility].  5.  I understand that in the event of a medical emergency, I will be transported by local paramedics to [facility] or other hospital emergency department.  6.  I certify that I have read and fully understand the above consent to operation and/or other procedure.    7.  I acknowledge that my physician has explained sedation/analgesia administration to me including the risks and benefits.  I consent to the administration of sedation/analgesia as may be necessary or desirable in the judgement of my physician.    Witness signature: ___________________________________________________ Date:   ______/______/_____                    Time:  ________ A.M.  P.M.       Patient Name:  ______________________________________________________  (please print)      Patient signature:  ___________________________________________________             Relationship to Patient:           []  Parent    Responsible person                          []  Spouse  In case of minor or                    [] Other  _____________   Incompetent name:  __________________________________________________                               (please print)      _____________      Responsible person  In case of minor or  Incompetent signature:  _______________________________________________    Statement of Physician  My signature below affirms that prior to the time of the procedure, I have explained to the patient and/or his/her guardian, the risks and benefits involved in the proposed treatment and any reasonable alternative to the proposed treatment.  I have also explained the risks and benefits involved in the refusal of the proposed treatment and have answered the patient's questions.                        Date:  ______/______/_______  Provider                      Signature:  __________________________________________________________       Time:  ___________ A.M    P.M.

## (undated) NOTE — LETTER
12/29/21        Jhon Morales [id number]      Dear Lemuel Morataya,    [facility] records indicate that you have outstanding lab work and or testing that was ordered for you and has not yet been completed:  Orders Placed This Encoun